# Patient Record
Sex: MALE | Race: BLACK OR AFRICAN AMERICAN | URBAN - METROPOLITAN AREA
[De-identification: names, ages, dates, MRNs, and addresses within clinical notes are randomized per-mention and may not be internally consistent; named-entity substitution may affect disease eponyms.]

---

## 2021-08-28 ENCOUNTER — TELEPHONE ENCOUNTER (OUTPATIENT)
Dept: URBAN - METROPOLITAN AREA CLINIC 13 | Facility: CLINIC | Age: 71
End: 2021-08-28

## 2021-08-29 ENCOUNTER — TELEPHONE ENCOUNTER (OUTPATIENT)
Dept: URBAN - METROPOLITAN AREA CLINIC 13 | Facility: CLINIC | Age: 71
End: 2021-08-29

## 2024-12-02 ENCOUNTER — OFFICE VISIT (OUTPATIENT)
Dept: URBAN - METROPOLITAN AREA CLINIC 84 | Facility: CLINIC | Age: 74
End: 2024-12-02

## 2024-12-02 ENCOUNTER — TELEPHONE ENCOUNTER (OUTPATIENT)
Dept: URBAN - METROPOLITAN AREA CLINIC 6 | Facility: CLINIC | Age: 74
End: 2024-12-02

## 2024-12-02 RX ORDER — SEMAGLUTIDE 0.68 MG/ML
INJECT 0.5 MG INTO THE SKIN ONE TIME PER WEEK INJECTION, SOLUTION SUBCUTANEOUS
Qty: 3 MILLILITER | Refills: 2 | Status: ACTIVE | COMMUNITY

## 2024-12-02 RX ORDER — DAPAGLIFLOZIN 5 MG/1
TAKE 1 TABLET BY MOUTH EVERY DAY IN THE MORNING TABLET, FILM COATED ORAL
Qty: 30 EACH | Refills: 1 | Status: ACTIVE | COMMUNITY

## 2024-12-02 RX ORDER — BENZONATATE 100 MG/1
CAPSULE ORAL
Qty: 30 CAPSULE | Status: ACTIVE | COMMUNITY

## 2024-12-02 RX ORDER — TORSEMIDE 20 MG/1
TABLET ORAL
Qty: 180 TABLET | Status: ACTIVE | COMMUNITY

## 2024-12-02 RX ORDER — MOLNUPIRAVIR 200 MG/1
CAPSULE ORAL
Qty: 40 CAPSULE | Status: ACTIVE | COMMUNITY

## 2024-12-02 RX ORDER — SEMAGLUTIDE 0.68 MG/ML
INJECTION, SOLUTION SUBCUTANEOUS
Qty: 3 MILLILITER | Status: ACTIVE | COMMUNITY

## 2024-12-02 RX ORDER — TAMSULOSIN HYDROCHLORIDE 0.4 MG/1
TAKE 1 CAPSULE BY MOUTH EVERY DAY AT NIGHT CAPSULE ORAL
Qty: 90 EACH | Refills: 0 | Status: ACTIVE | COMMUNITY

## 2024-12-02 RX ORDER — ERTAPENEM SODIUM 1 G/1
INJECTION, POWDER, LYOPHILIZED, FOR SOLUTION INTRAMUSCULAR; INTRAVENOUS
Qty: 7 EACH | Status: ACTIVE | COMMUNITY

## 2024-12-02 RX ORDER — PREGABALIN 100 MG/1
CAPSULE ORAL
Qty: 60 CAPSULE | Status: ACTIVE | COMMUNITY

## 2024-12-02 RX ORDER — FEBUXOSTAT 40 MG/1
TABLET, COATED ORAL
Qty: 90 TABLET | Status: ACTIVE | COMMUNITY

## 2024-12-02 RX ORDER — PANTOPRAZOLE SODIUM 40 MG/1
TAKE 1 TABLET BY MOUTH EVERY DAY IN THE MORNING TABLET, DELAYED RELEASE ORAL
Qty: 30 EACH | Refills: 0 | Status: ACTIVE | COMMUNITY

## 2024-12-02 RX ORDER — SODIUM BICARBONATE 650 MG/1
TAKE 2 TABLETS BY MOUTH IN THE MORNING AND TAKE 2 TABLETS AT BEDTIME TABLET ORAL
Qty: 360 EACH | Refills: 0 | Status: ACTIVE | COMMUNITY

## 2024-12-02 RX ORDER — COLCHICINE 0.6 MG/1
TAKE 1 TABLET BY MOUTH EVERY DAY AS NEEDED TABLET, FILM COATED ORAL
Qty: 90 EACH | Refills: 0 | Status: ACTIVE | COMMUNITY

## 2024-12-02 RX ORDER — HYDROCODONE BITARTRATE AND ACETAMINOPHEN 5; 325 MG/1; MG/1
TABLET ORAL
Qty: 12 TABLET | Status: ACTIVE | COMMUNITY

## 2024-12-02 RX ORDER — SERTRALINE HYDROCHLORIDE 50 MG/1
TABLET ORAL
Qty: 90 TABLET | Status: ACTIVE | COMMUNITY

## 2024-12-02 RX ORDER — METOLAZONE 2.5 MG/1
TAKE 1 TABLET BY MOUTH ONCE A WEEK. AS NEEDED TABLET ORAL
Qty: 12 EACH | Refills: 1 | Status: ACTIVE | COMMUNITY

## 2024-12-02 RX ORDER — PROMETHAZINE HYDROCHLORIDE AND DEXTROMETHORPHAN HYDROBROMIDE ORAL SOLUTION 15; 6.25 MG/5ML; MG/5ML
SOLUTION ORAL
Qty: 118 MILLILITER | Status: ACTIVE | COMMUNITY

## 2024-12-05 ENCOUNTER — DASHBOARD ENCOUNTERS (OUTPATIENT)
Age: 74
End: 2024-12-05

## 2024-12-05 PROBLEM — 724556004: Status: ACTIVE | Noted: 2024-12-05

## 2024-12-06 ENCOUNTER — OFFICE VISIT (OUTPATIENT)
Dept: URBAN - METROPOLITAN AREA CLINIC 46 | Facility: CLINIC | Age: 74
End: 2024-12-06
Payer: MEDICARE

## 2024-12-06 ENCOUNTER — LAB OUTSIDE AN ENCOUNTER (OUTPATIENT)
Dept: URBAN - METROPOLITAN AREA CLINIC 46 | Facility: CLINIC | Age: 74
End: 2024-12-06

## 2024-12-06 DIAGNOSIS — R13.19 ESOPHAGEAL DYSPHAGIA: ICD-10-CM

## 2024-12-06 DIAGNOSIS — Z86.0100 PERSONAL HISTORY OF COLON POLYPS, UNSPECIFIED: ICD-10-CM

## 2024-12-06 DIAGNOSIS — K21.9 GASTROESOPHAGEAL REFLUX DISEASE, UNSPECIFIED WHETHER ESOPHAGITIS PRESENT: ICD-10-CM

## 2024-12-06 DIAGNOSIS — R63.4 UNINTENTIONAL WEIGHT LOSS: ICD-10-CM

## 2024-12-06 DIAGNOSIS — D50.0 IRON DEFICIENCY ANEMIA DUE TO CHRONIC BLOOD LOSS: ICD-10-CM

## 2024-12-06 PROBLEM — 235595009: Status: ACTIVE | Noted: 2024-12-06

## 2024-12-06 PROCEDURE — 99204 OFFICE O/P NEW MOD 45 MIN: CPT

## 2024-12-06 RX ORDER — KETOROLAC TROMETHAMINE 10 MG/1
1 TABLET WITH FOOD OR MILK AS NEEDED TABLET, FILM COATED ORAL
Qty: 20 TABLET | Status: ACTIVE | COMMUNITY

## 2024-12-06 RX ORDER — DAPAGLIFLOZIN 5 MG/1
1 TABLET TABLET, FILM COATED ORAL ONCE A DAY
Qty: 30 TABLET | Status: ACTIVE | COMMUNITY

## 2024-12-06 RX ORDER — SEMAGLUTIDE 0.68 MG/ML
AS DIRECTED INJECTION, SOLUTION SUBCUTANEOUS
Status: ACTIVE | COMMUNITY

## 2024-12-06 RX ORDER — COLCHICINE 0.6 MG/1
1 TABLET TABLET, FILM COATED ORAL TWICE A DAY
Qty: 180 TABLET | Status: ACTIVE | COMMUNITY

## 2024-12-06 RX ORDER — FEBUXOSTAT 40 MG/1
1 TABLET TABLET, COATED ORAL ONCE A DAY
Qty: 90 TABLET | Status: ACTIVE | COMMUNITY

## 2024-12-06 RX ORDER — TORSEMIDE 20 MG/1
AS DIRECTED TABLET ORAL
Status: ACTIVE | COMMUNITY

## 2024-12-06 RX ORDER — SERTRALINE HYDROCHLORIDE 50 MG/1
1 TABLET TABLET ORAL ONCE A DAY
Qty: 90 TABLET | Status: ACTIVE | COMMUNITY

## 2024-12-06 RX ORDER — PANTOPRAZOLE SODIUM 40 MG/1
1 TABLET TABLET, DELAYED RELEASE ORAL ONCE A DAY
Qty: 90 TABLET | Refills: 3 | OUTPATIENT
Start: 2024-12-06

## 2024-12-06 RX ORDER — ERTAPENEM SODIUM 1 G/1
AS DIRECTED INJECTION, POWDER, LYOPHILIZED, FOR SOLUTION INTRAMUSCULAR; INTRAVENOUS
Status: ACTIVE | COMMUNITY

## 2024-12-06 NOTE — HPI-TODAY'S VISIT:
74-year-old male presents for evaluation of dysphagia x 2 months. Patient presents in wheelchair and is in considerable pain/distress throughout visit, which complicates history-taking. Wife states that his tailbone is causing him excruciating pain since he had back surgery. He endorses difficulty swallowing solids, with some dysphagia to liquids, but denies regurgitation. He is having frequent reflux, which occurs daily. Patient is taking Tums prn for symptoms. Currently, he is experiencing ongoing reflux episode which has lasted 1.5 hrs. His wife states he has completely lost his appetite since surgery earlier this fall, and has lost 100 lbs due to anorexia. Denies N/V, abdominal pain.  Patient presented to ED 7/2024 for symptomatic anemia. He was found to have hemoglobin 5.3, and endorsed black stools at that time. He was transfused with 2 units PRBCs and had IV iron infusion. EGD 7/15/2024 showed gastric angiodysplasia status post ablation, gastric polyp.  No old/fresh blood or active bleeding noted.  On path, gastric xanthoma. Colonoscopy 7/15/2024 with diverticulosis, internal hemorrhoids, removal of fragments of tubular adenoma.  Recommended small bowel PillCam as outpatient. Labs 8/19/2024 with elevated creatinine 1.94.  Hemoglobin 10.6 with MCV 87.1. Patient denies overt GI bleeding currently.   Patient presented to ED 9/16/2024 with right hand swelling.  He was admitted, Ortho was consulted and he had amputation.  Patient became septic.  PICC line was placed.  Labs in hospital show hemoglobin ranging from 10.2-11.3.  He was discharged home with home health PT and nurse. No more recent labs available. Echo 10/31/2024 with low normal LVEF 40 to 45%, mild global hypokinesis. Patient is on supplemental O2. He is unable to bear any weight or self-transfer.

## 2024-12-06 NOTE — PHYSICAL EXAM CONSTITUTIONAL:
ill-appearing male, in distress, in wheelchair, impaired communication ability due to pain level
Explanation of exam/test/Position of comfort/Fall precautions/Bedside visitors/Call bell/Side rails

## 2025-01-14 ENCOUNTER — OFFICE VISIT (OUTPATIENT)
Dept: URBAN - METROPOLITAN AREA CLINIC 44 | Facility: CLINIC | Age: 75
End: 2025-01-14